# Patient Record
Sex: FEMALE | Race: WHITE | URBAN - METROPOLITAN AREA
[De-identification: names, ages, dates, MRNs, and addresses within clinical notes are randomized per-mention and may not be internally consistent; named-entity substitution may affect disease eponyms.]

---

## 2020-03-12 ENCOUNTER — TELEPHONE (OUTPATIENT)
Dept: FAMILY MEDICINE CLINIC | Facility: CLINIC | Age: 85
End: 2020-03-12

## 2020-03-12 NOTE — TELEPHONE ENCOUNTER
----- Message from Ramona Anderson MA sent at 3/11/2020  3:53 PM EDT -----  This pt is in need for an appointment  This pt have never been seen in out department nor has an [place] [address] appointment  Please call to make these appointments  After 3 attempts please send back to Odessa Hassan to send letter  If found that the pt has moved or est care somewhere else please provide new address or PCP name to Odessa Hassan  Directions to turn this in to Telephone Call:  Click Telephone Call button In task  When asked if you want to copy the message text to the encounter note, select yes  For all attemps make to contact the pt please make sure you are using the contact info tab in the Telephone Call encounter to document

## 2020-03-12 NOTE — TELEPHONE ENCOUNTER
Per Shwetha Price patient has not be in [address] [place] [address],[address] home since 2012

## 2020-03-16 NOTE — TELEPHONE ENCOUNTER
03/15/20 8:30 PM     Thank you for your request  Your request has been received, reviewed, and the patient chart updated  The PCP has successfully been removed with a patient attribution note  This message will now be completed      Thank you  Christine Santillan